# Patient Record
Sex: MALE | Race: WHITE
[De-identification: names, ages, dates, MRNs, and addresses within clinical notes are randomized per-mention and may not be internally consistent; named-entity substitution may affect disease eponyms.]

---

## 2018-10-08 ENCOUNTER — HOSPITAL ENCOUNTER (OUTPATIENT)
Dept: HOSPITAL 88 - MRI | Age: 58
End: 2018-10-08
Attending: PSYCHIATRY & NEUROLOGY
Payer: COMMERCIAL

## 2018-10-08 DIAGNOSIS — R42: Primary | ICD-10-CM

## 2018-10-08 PROCEDURE — 70553 MRI BRAIN STEM W/O & W/DYE: CPT

## 2018-10-08 NOTE — DIAGNOSTIC IMAGING REPORT
Exam: Brain MRI without and with IV contrast

History: Vertigo

Comparison studies: Prior brain MRI of 12/23/2010 is unavailable on the PACS

for comparison at time of dictation.



Technique:

Precontrast sagittal and axial T2 FS, axial DWI, precontrast axial T1 FLAIR,

axial T2*GRE and postcontrast axial coronal T1 FS and axial T2 FLAIR through

the whole brain. Additional 3-D T2 with axial, coronal and sagittal reformats,

axial T1 FLAIR and postcontrast axial and coronal T1 FLAIR through the IACs

were obtained.

Intravenous contrast: 20 cc MultiHance.



Findings:



Scalp: No abnormal signal.  No masses.

Bone marrow: Normal in signal intensity.



Brain sulci: Appropriate for age.

Ventricles: Normal in size . No hydrocephalus.



Parenchyma:

No abnormal signal intensities. Enhancing under malleus.

No masses, hemorrhage, or acute or chronic ischemic insults.



Cerebellopontine angle cisterns: No mass.

IACs: Normal nerves VII and VIII.

Labyrinths: Grossly normal formation. No abnormal signal intensity or

enhancement.

Mastoids: No abnormal signal intensity or enhancement.



Suprasellar region: No abnormalities.

Craniocervical junction: Patent foramen magnum.  No Chiari one malformation.

Vessels: Normal flow-voids in the arteries and sinuses. Right vertebral artery

is hypoplastic.





IMPRESSION:  



No abnormalities.



Signed by: Dr. Francis Smart M.D. on 10/8/2018 4:22 PM

## 2019-02-11 ENCOUNTER — HOSPITAL ENCOUNTER (OUTPATIENT)
Dept: HOSPITAL 88 - RAD | Age: 59
End: 2019-02-11
Attending: INTERNAL MEDICINE
Payer: COMMERCIAL

## 2019-02-11 DIAGNOSIS — M79.642: Primary | ICD-10-CM

## 2019-02-11 NOTE — DIAGNOSTIC IMAGING REPORT
Radiographs of the left finger - 3 views



HISTORY:  Abnormal growth on left fifth digit area

COMPARISON: None available.

     

FINDINGS:

Bones:

No acute displaced fracture.  

Osseous alignment is within normal limits.



Joints:

The joint spaces are well-maintained.



Soft tissues:

Mild soft tissue swelling about the fifth finger. No radiopaque foreign body.





IMPRESSION: 

Mild soft tissue swelling about the fifth finger. No radiopaque foreign body.



Signed by: Dr. John Paul Maldonado M.D. on 2/11/2019 4:10 PM

## 2019-06-12 LAB
BASOPHILS # BLD AUTO: 0 10*3/UL (ref 0–0.1)
BASOPHILS NFR BLD AUTO: 0.5 % (ref 0–1)
DEPRECATED NEUTROPHILS # BLD AUTO: 4.8 10*3/UL (ref 2.1–6.9)
EOSINOPHIL # BLD AUTO: 0.2 10*3/UL (ref 0–0.4)
EOSINOPHIL NFR BLD AUTO: 2.4 % (ref 0–6)
ERYTHROCYTE [DISTWIDTH] IN CORD BLOOD: 12.9 % (ref 11.7–14.4)
HCT VFR BLD AUTO: 46 % (ref 38.2–49.6)
HGB BLD-MCNC: 15.3 G/DL (ref 14–18)
LYMPHOCYTES # BLD: 2.5 10*3/UL (ref 1–3.2)
LYMPHOCYTES NFR BLD AUTO: 31.1 % (ref 18–39.1)
MCH RBC QN AUTO: 28.8 PG (ref 28–32)
MCHC RBC AUTO-ENTMCNC: 33.3 G/DL (ref 31–35)
MCV RBC AUTO: 86.5 FL (ref 81–99)
MONOCYTES # BLD AUTO: 0.5 10*3/UL (ref 0.2–0.8)
MONOCYTES NFR BLD AUTO: 6.6 % (ref 4.4–11.3)
NEUTS SEG NFR BLD AUTO: 59.2 % (ref 38.7–80)
PLATELET # BLD AUTO: 238 X10E3/UL (ref 140–360)
RBC # BLD AUTO: 5.32 X10E6/UL (ref 4.3–5.7)

## 2019-06-13 ENCOUNTER — HOSPITAL ENCOUNTER (OUTPATIENT)
Dept: HOSPITAL 88 - OR | Age: 59
Discharge: HOME | End: 2019-06-13
Attending: INTERNAL MEDICINE
Payer: COMMERCIAL

## 2019-06-13 VITALS — SYSTOLIC BLOOD PRESSURE: 121 MMHG | DIASTOLIC BLOOD PRESSURE: 86 MMHG

## 2019-06-13 DIAGNOSIS — M06.9: ICD-10-CM

## 2019-06-13 DIAGNOSIS — E66.9: ICD-10-CM

## 2019-06-13 DIAGNOSIS — E11.9: ICD-10-CM

## 2019-06-13 DIAGNOSIS — K62.1: ICD-10-CM

## 2019-06-13 DIAGNOSIS — I10: ICD-10-CM

## 2019-06-13 DIAGNOSIS — Z01.810: ICD-10-CM

## 2019-06-13 DIAGNOSIS — N20.0: ICD-10-CM

## 2019-06-13 DIAGNOSIS — D12.4: ICD-10-CM

## 2019-06-13 DIAGNOSIS — J45.909: ICD-10-CM

## 2019-06-13 DIAGNOSIS — K44.9: ICD-10-CM

## 2019-06-13 DIAGNOSIS — Z01.812: ICD-10-CM

## 2019-06-13 DIAGNOSIS — K64.8: ICD-10-CM

## 2019-06-13 DIAGNOSIS — M54.5: ICD-10-CM

## 2019-06-13 DIAGNOSIS — Z79.84: ICD-10-CM

## 2019-06-13 DIAGNOSIS — G47.33: ICD-10-CM

## 2019-06-13 DIAGNOSIS — J44.9: ICD-10-CM

## 2019-06-13 DIAGNOSIS — Z71.3: ICD-10-CM

## 2019-06-13 DIAGNOSIS — F41.9: ICD-10-CM

## 2019-06-13 DIAGNOSIS — Z12.11: Primary | ICD-10-CM

## 2019-06-13 DIAGNOSIS — I48.91: ICD-10-CM

## 2019-06-13 DIAGNOSIS — E23.0: ICD-10-CM

## 2019-06-13 DIAGNOSIS — F32.9: ICD-10-CM

## 2019-06-13 PROCEDURE — 93005 ELECTROCARDIOGRAM TRACING: CPT

## 2019-06-13 PROCEDURE — 36415 COLL VENOUS BLD VENIPUNCTURE: CPT

## 2019-06-13 PROCEDURE — 85025 COMPLETE CBC W/AUTO DIFF WBC: CPT

## 2019-06-13 PROCEDURE — 82948 REAGENT STRIP/BLOOD GLUCOSE: CPT

## 2019-06-13 PROCEDURE — 45385 COLONOSCOPY W/LESION REMOVAL: CPT

## 2019-06-13 PROCEDURE — 45378 DIAGNOSTIC COLONOSCOPY: CPT

## 2019-06-13 NOTE — XMS REPORT
Summary of Care: 3/14/17 - 3/14/17

                             Created on: 2113



MARIOLA BLACK

External Reference #: [id number]

: 1960

Sex: Male



Demographics







                          Address                   [address] [place]  [address]-

 

                          Home Phone                [phone]

 

                          Preferred Language        English

 

                          Marital Status            

 

                          Scientology Affiliation     None

 

                          Race                      White/

 

                          Ethnic Group              Non-





Author







                          Author                    [facility]

 

                          Organization              [facility]

 

                          Address                   Unknown

 

                          Phone                     Unavailable







Encounter





MULUGETA Dejesus(FIN) [id number] Date(s): 3/14/17 - 3/14/17

[facility] [address] [place] [address]-     [phone]
[phone]

Discharge Diagnosis: Other spondylosis with radiculopathy, lumbosacral region

Discharge Disposition: Home or Self Care

Attending Physician: Costa Barton DO





Vital Signs







                    1                   2                   3



                                         Most recent to   



                                         oldest [Reference   



                                         Range]:   

 

                                        185.42 cm 

                    (3/14/17 7:39 AM)                         



                                         Height   

 

                                        98.1 DegF 

                          (3/14/17 10:14 AM)        97.8 DegF 

                          (3/14/17 7:39 AM)          



                                         Temperature Oral   



                                         [96.4-99.1 DegF]   

 

                                        138/75 mmHg 

                          (3/14/17 10:14 AM)        148/89 mmHg 

*HI*

                          (3/14/17 7:39 AM)          



                                         Blood Pressure   



                                         [/60-90 mmHg]   

 

                                        18 BRMIN 

                    (3/14/17 7:39 AM)                         



                                         Respiratory Rate   



                                         [14-20 BRMIN]   

 

                                        87 bpm 

                          (3/14/17 10:14 AM)        95 bpm 

                          (3/14/17 9:01 AM)         115 bpm 

*HI*

                                        (3/14/17 7:39 AM)



                                         Peripheral Pulse   



                                         Rate [ bpm]   

 

                                        132.273 kg 

                    (3/14/17 7:39 AM)                         



                                         Weight   

 

                                        38.47 m2 

                    (3/14/17 7:39 AM)                         



                                         Body Mass Index   







Problem List







    



              Condition     Effective Dates     Status       Health Status     Informant

 

    



                           Diabetes(Confirmed)       Active  

 

    



                           HTN                       Active  



                                         (hypertension)(Confi    



                                         rmed)    

 

    



                           HTN                       Active  



                                         (hypertension)(Confi    



                                         rmed)    

 

    



                           Hypopituitarism(Conf      Active  



                                         irmed)    

 

    



                           Spondylitis(Confirme      Active  



                                         d)    

 

    



                           Osteoarthritis(Confi      Active  



                                         rmed)    







Allergies, Adverse Reactions, Alerts







   



                 Substance       Reaction        Severity        Status

 

   



                           NKDA                      Active







Medications





Flexeril

10 mg, Route: PO, ONCE, Dosing Weight 132.273, kg, Priority: STAT, Start date: 0
3/14/17 7:55:00 CDT, Stop date: 17 7:55:00 CDT

Start Date: 3/14/17

Stop Date: 3/14/17

Status: Completed



Flexeril 10 mg oral tablet

10 mg, PO, TID, PRN Muscle Spasm, X 7 day, # 30 tab, 0 Refill(s)

Start Date: 3/14/17

Stop Date: 3/21/17

Status: Ordered



ketOROLAC

60 mg, Route: IM, ONCE, Dosing Weight 132.273, kg, Priority: STAT, Start date: 0
3/14/17 7:55:00 CDT, Stop date: 17 7:55:00 CDT

Start Date: 3/14/17

Stop Date: 3/14/17

Status: Completed



ketOROLAC 10 mg oral tablet

10 mg=1 tab, PO, TID, PRN Pain, X 5 day, # 15 tab, 0 Refill(s)

Start Date: 3/14/17

Stop Date: 3/19/17

Status: Ordered



Norco 5/325 oral tablet

1 tab, Route: PO, Drug Form: TAB, Dosing Weight 132.273, kg, ONCE, STAT, Start d
ate: 17 7:55:00 CDT, Stop date: 17 7:55:00 CDT

Start Date: 3/14/17

Stop Date: 3/14/17

Status: Completed



Results





No data available for this section



Immunizations





No data available for this section



Procedures





No data available for this section



Social History







 



                           Social History Type       Response

 

 



                           Smoking Status            Never smoker; Exposure to Tobacco Smoke None; Cigarette Smoking

 Last 365



                                         Days No; Reg Smoking Cessation Counseling No







Assessment and Plan





No data available for this section

## 2019-06-13 NOTE — XMS REPORT
Patient Summary Document

                             Created on: 2019



MARIOLA BLACK

External Reference #: [id number]

: 1960

Sex: Male



Demographics







                          Address                   [address]

[place]  [address]

 

                          Home Phone                [phone]

 

                          Preferred Language        Unknown

 

                          Marital Status            Unknown

 

                          Latter day Affiliation     Unknown

 

                          Race                      Unknown

 

                                        Additional Race(s)  

 

                          Ethnic Group              Unknown





Author







                          Author                    [facility]

 

                          Address                   Unknown

 

                          Phone                     Unavailable







Care Team Providers







                    Care Team Member Name    Role                Phone

 

                    KATHLEEN HUNT    Unavailable         Unavailable

 

                    VERONICA BAILEY    Unavailable         Unavailable







Problems

This patient has no known problems.



Allergies, Adverse Reactions, Alerts

This patient has no known allergies or adverse reactions.



Medications

This patient has no known medications.



Results







           Test Description    Test Time    Test Comments    Text Results    Atomic Results    Result

 Comments

 

                FINGER LEFT     2019 16:09:00                                                               

                                           [facility]    
                   [address] [place] [address]    
 Patient Name: MARIOLA BLACK                                   MR #: [id number]  
                  : 1960                                   Age/Sex: 
58/M  Acct #: [id number]                              Req #: [id number]  Adm 
Physician:                                                      Ordered by: KATHLEEN BRUSH MD                            Report #: [id number]        
Location: [facility]                                     Room/Bed:                     
___________________________________________________________________________________________________
   Procedure: [id number] DX/FINGER LEFT  Exam Date: 19                     
      Exam Time: 1550                                              REPORT 
STATUS: Signed    Radiographs of the left finger - 3 views      HISTORY:  Abnor
mal growth on left fifth digit area   COMPARISON: None available.           
FINDINGS:   Bones:   No acute displaced fracture.     Osseous alignment is 
within normal limits.      Joints:   The joint spaces are well-maintained.      
Soft tissues:   Mild soft tissue swelling about the fifth finger. No radiopaque 
foreign body.         IMPRESSION:    Mild soft tissue swelling about the fifth 
finger. No radiopaque foreign body.      Signed by: Dr. John Paul Maldonado M.D. on 
2019 4:10 PM        Dictated By: JOHN PAUL MALDONADO MD, MD  Electronically Signed
By: JOHN PAUL MALDONADO MD, MD on 19  Transcribed By: NERISSA on 19       COPY TO:   KATHLEEN HUNT MD             

 

                MRI BRAIN WOW    2018-10-08 16:17:00                        [place] [address]      Patient Name: 
MARIOLA BLACK   MR #: [id number]    : 1960 Age/Sex: 58/M  Acct #: 
[id number] Req #: [id number]  Adm Physician:     Ordered by: VERONICA BAILEY MD  Report #: [id number]   Location: MRI  Room/Bed:     
______________________________________________________________________________
_____________________    Procedure: [id number] MRI/MRI BRAIN WOW  Exam Date:     
                       Exam Time:        REPORT STATUS: Signed    Exam: Brain 
MRI without and with IV contrast   History: Vertigo   Comparison studies: Prior 
brain MRI of 2010 is unavailable on the PACS   for comparison at time of 
dictation.      Technique:   Precontrast sagittal and axial T2 FS, axial DWI, 
precontrast axial T1 FLAIR,   axial T2*GRE and postcontrast axial coronal T1 FS 
and axial T2 FLAIR through   the whole brain. Additional 3-D T2 with axial, 
coronal and sagittal reformats,   axial T1 FLAIR and postcontrast axial and 
coronal T1 FLAIR through the IACs   were obtained.   Intravenous contrast: 20 cc
MultiHance.      Findings:      Scalp: No abnormal signal.  No masses.   Bone 
marrow: Normal in signal intensity.      Brain sulci: Appropriate for age.   
Ventricles: Normal in size . No hydrocephalus.      Parenchyma:   No abnormal 
signal intensities. Enhancing under malleus.   No masses, hemorrhage, or acute 
or chronic ischemic insults.      Cerebellopontine angle cisterns: No mass.   
IACs: Normal nerves VII and VIII.   Labyrinths: Grossly normal formation. No 
abnormal signal intensity or   enhancement.   Mastoids: No abnormal signal 
intensity or enhancement.      Suprasellar region: No abnormalities.   
Craniocervical junction: Patent foramen magnum.  No Chiari one malformation.   
Vessels: Normal flow-voids in the arteries and sinuses. Right vertebral artery  
is hypoplastic.         IMPRESSION:        No abnormalities.      Signed by: Dr. Benitez Smart M.D. on 10/8/2018 4:22 PM        Dictated By: BENITEZ SMART MD 
Electronically Signed By: BENITEZ SMART MD on 10/08/18 162  Transcribed By: 
NERISSA on 10/08/18 1622       COPY TO:   VERONICA BAILEY MD

## 2019-06-13 NOTE — XMS REPORT
Continuity of Care Document

                             Created on: 2017



MARIOLA BLACK

External Reference #: [id number]

: 1960

Sex: Male



Demographics







                          Address                   [address] [place] [address]

 

                          Home Phone                [phone]

 

                          Preferred Language        Unknown

 

                          Marital Status            Unknown

 

                          [employer] Affiliation     Unknown

 

                          Race                      Unknown

 

                          Ethnic Group              Unknown





Author







                          Author                    [facility]              Interface

 

                          Address                   Unknown

 

                          Phone                     Unavailable



                                                    



Problems

                    





                    Problem                            Status                            Onset Date     

                          Classification                            Date Reported       

                          Comments                            Source                    

 

                          Discharge Diagnosis: Dorsalgia                                                    

                    2017       

                                                      [facility]                    

 

                    BACK PAIN                            Active                            2017   

                                                                                       

                                        [facility]                    

 

                          Discharge Diagnosis: Acute back pain with sciatica                                

                          03/15/2017                                                   

                    2017                                                        [facility]    

                

 

                                        Discharge Diagnosis: Other spondylosis with radiculopathy, lumbosacral region   

                                                      2017                                           

                                        [facility]                    

 

                    Diabetes                            Active                                          

                    Problem                            2017                            

                                        [facility]                    

 

                          HTN (<span ID="[id number]">Confirmed</span>)                            Active  

                                                      Problem                        

                    2017                                                        [facility]     

               

 

                          HTN (<span ID="[id number]">Confirmed</span>)                            Active  

                                                      Problem                        

                    2017                                                        [facility]     

               

 

                    Hypopituitarism                            Active                                   

                          Problem                            2017                   

                                                      [facility]                    

 

                    Spondylitis                            Active                                       

                          Problem                            2017                       

                                                      [facility]                    

 

                    Osteoarthritis                            Active                                    

                          Problem                            2017                    

                                                      [facility]                    

 

                    Back pain                            Active                                         

                          Problem                            2017                         

                                                      [facility]                    

 

                    Sciatic pain                            Active                                      

                          Problem                            2017                      

                                                      [facility]                    



                                                                                
                                                                                
                                                                                
                     



Medications

                    





                    Medication                            Details                            Route      

                          Status                            Patient Instructions         

                          Ordering Provider                            Order Date           

                                        Source                    

 

                                        Acetaminophen 325 MG / Hydrocodone Bitartrate 10 MG Oral Tablet [Norco 10/325]  

                                        1 tab, PO, Q6H, PRN Pain, X 5 day, # 19 tab, 0 Refill(s) 

                                                       Active                        

                                                                            2017                 

                                        [facility]                    

 

                          predniSONE 20 mg oral tablet                            See Special Instructions, 

PO, Daily, 4 day regimen:  Day 1 - 40 mg (2 tabs)  Day 2 - 30 mg (1 1/2 tabs)  
Day 3 - 20 mg (1 tab)  Day 4 - 10 mg (1/2 tab), X 4 day, # 6 tab, 0 Refill(s)   
                                                    Active                             

                                                       2017                    

                                        [facility]                    

 

                          Zofran                            4 mg, Route: IVP, Drug form: INJ, ONCE, Dosing Weight

 132.727, kg, Priority: STAT, Start date: 17 12:11:00 CDT, Stop date: 
17 12:11:00 CDT                                                        Inactive

                                                                                    2017

                                        [facility]                    

 

                          Morphine                            4 mg, Route: IVP, ONCE, Dosing Weight 132.727,

 kg, Priority: STAT, Start date: 17 12:11:00 CDT, Stop date: 17 
12:11:00 CDT                                                        Inactive         

                                                                            2017  

                                        [facility]                    

 

                          Sodium Chloride 0.154 MEQ/ML Injectable Solution                            1,000 

mL, 1,000 ml/hr, Infuse Over: 1 hr, Route: IV, 1,000, Drug form: INJ, ONCE, 
Priority: STAT, Dosing Weight 132.727 kg, Start date: 17 11:44:00 CDT, 
Duration: 1 doses or times, Stop date: 17 11:44:00 CDT                    
                                                Inactive                                            

                                                2017                            [facility]

                    

 

                          Dexamethasone                            10 mg, Route: IM, ONCE, Dosing Weight 132.727,

 kg, Priority: STAT, Start date: 17 11:19:00 CDT, Stop date: 17 
11:19:00 CDT                                                        Inactive         

                                                                            2017  

                                        [facility]                    

 

                                        Acetaminophen 325 MG / Hydrocodone Bitartrate 10 MG Oral Tablet [Norco 10/325]  

                                        1 tab, Route: PO, Drug Form: TAB, Dosing Weight 132.727, 

kg, ONCE, STAT, Start date: 17 11:19:00 CDT, Stop date: 17 11:19:00 
CDT                                                        Inactive                  

                                                                            2017           

                                        [facility]                    

 

                          Valium                            10 mg, Route: PO, ONCE, Dosing Weight 132.727, kg,

 Priority: STAT, Start date: 17 11:19:00 CDT, Stop date: 17 11:19:00
CDT                                                        Inactive                  

                                                                            2017           

                                        [facility]                    

 

                          Diazepam 5 MG Oral Tablet [Valium]                            5 mg=1 tab, PO, TID,

 PRN MUSCLE SPASM, X 7 day, # 20 tab, 0 Refill(s)                                  

                      Active                                                           

                          03/15/2017                            [facility]          

          

 

                                        Acetaminophen 300 MG / Codeine Phosphate 60 MG Oral Tablet [Tylenol with Codeine

 #4]                                    1 tab, PO, Q6H, PRN Pain, not to exceed 4000 mg acetaminophen

 per day, X 7 day, # 28 tab, 0 Refill(s)                                           

                    Active                                                                    

                          03/15/2017                            [facility]                   

 

 

                          Morphine                            2 mg, Route: IVP, ONCE, Dosing Weight 132.727,

 kg, Priority: STAT, Start date: 03/15/17 1:22:00 CDT, Stop date: 03/15/17 
1:22:00 CDT                                                        Inactive          

                                                                            03/15/2017   

                                        [facility]                    

 

                          Ketorolac                            30 mg, Route: IVP, Drug form: INJ, ONCE, Dosing

 Weight 132.727, kg, Priority: STAT, Start date: 03/15/17 1:22:00 CDT, Stop 
date: 03/15/17 1:22:00 CDT                                                        Inactive

                                                                                    03/15/2017

                                        [facility]                    

 

                          Valium                            10 mg, Route: IVP, Drug form: INJ, ONCE, Dosing 

Weight 132.727, kg, Priority: STAT, Start date: 03/15/17 1:22:00 CDT, Stop date:
03/15/17 1:22:00 CDT                                                        Inactive 

                                                                                   03/15/2017

                                        [facility]                    

 

                          Dexamethasone                            8 mg, Route: IVP, ONCE, Dosing Weight 132.727,

 kg, Priority: STAT, Start date: 03/15/17 1:21:00 CDT, Stop date: 03/15/17 
1:21:00 CDT                                                        Inactive          

                                                                            03/15/2017   

                                        [facility]                    

 

                          Ketorolac Tromethamine 10 MG Oral Tablet                            10 mg=1 tab, PO,

 TID, PRN Pain, X 5 day, # 15 tab, 0 Refill(s)                                     

                    Active                                                              

                          2017                            [facility]             

       

 

                                        Cyclobenzaprine hydrochloride 10 MG Oral Tablet [Flexeril]                      

                                        10 mg, PO, TID, PRN Muscle Spasm, X 7 day, # 30 tab, 0 Refill(s)             

                                                Active                                      

                                                2017                            [facility]                    

 

                                        Acetaminophen 325 MG / Hydrocodone Bitartrate 5 MG Oral Tablet [Norco 5/325]    

                                        1 tab, Route: PO, Drug Form: TAB, Dosing Weight 132.273, kg,

 ONCE, STAT, Start date: 17 7:55:00 CDT, Stop date: 17 7:55:00 CDT  
                                                      Inactive                        

                                                                            2017                 

                                        [facility]                    

 

                          Flexeril                            10 mg, Route: PO, ONCE, Dosing Weight 132.273,

 kg, Priority: STAT, Start date: 17 7:55:00 CDT, Stop date: 17 
7:55:00 CDT                                                        Inactive          

                                                                            2017   

                                        [facility]                    

 

                          Ketorolac                            60 mg, Route: IM, ONCE, Dosing Weight 132.273,

 kg, Priority: STAT, Start date: 17 7:55:00 CDT, Stop date: 17 
7:55:00 CDT                                                        Inactive          

                                                                            2017   

                                        [facility]                    



                                                                                
                                                                                
                                                                                
                                                                                
                                                    



Allergies, Adverse Reactions, Alerts

                    





                    Substance                            Category                            Reaction   

                          Severity                            Reaction type           

                          Status                            Date Reported                     

                          Comments                            Source                    



                                                                



Immunizations

                    





                    Immunization                            Date Given                            Site  

                          Status                            Last Updated             

                          Comments                            Source                    



                                                                        



Results

                    





                    Order Name                            Results                            Value      

                          Reference Range                            Date                

                          Interpretation                            Comments                       

                                        Source                    

 

                          Abdomen/Pelvis wo IV contrast CT                            Abdomen/Pelvis wo IV contrast

 CT                                     Patient Name: MARIOLA BLACK

: 1960; Age: 57 years  y/o Male

MR: [id number]



Study: Abdomen/Pelvis wo IV contrast CT 3/18/2017 11:18 AM CDT

Referring physician:Herman Marsh MD

Clinical Indication: Back pain; Back pain for 1 week, radiating to upper back 
and lower left leg.   lumbar pain with radiculopathy. 3rd visit - dlp: 1138.42; 
  

Comparison: None





TECHNIQUE:

Sequential trans-axial images were obtained with a multi-detector helical CT 
without administration of IV contrast. Coronal and sagittal reconstructions were
obtained.  





FINDINGS: 

LUNG BASES: 2 mm noncalcified nodule right middle lobe, image 4 series 2. Mild 
atelectatic or fibrotic changes within the right lung base. No pleural effusion.
Heart size normal.



ABDOMINAL ORGANS: No focal liver or splenic abnormality. 5 mm nonobstructive 
right renal lower pole calyceal calculus. No other urinary tract calculus. No 
ureteral dilatation or obstruction. The adrenals, pancreas and gallbladder are 
unremarkable.



PERITONEUM AND RETROPERITONEUM: No abdominal or pelvic lymphadenopathy. No free 
intraperitoneal air. No abnormal fluid collection identified in the abdomen or 
pelvis. The abdominal aorta is unremarkable.



BOWEL: No bowel abnormality identified in the abdomen or pelvis. The appendix is
unremarkable.



PELVIS: No pelvic mass. Bladder is unremarkable.



BONES: Mild facet degenerative changes within the lumbar spine at L4-L5. Disc 
spaces are relatively well-maintained. No other bony lesion appreciated within 
the lumbar spine or pelvis.



IMPRESSION:



                                        5 mm nonobstructive right renal lower pole calyceal calculus.



No urinary tract obstruction or ureteral calculus, however.



No other acute abdominal or pelvic finding.



Mild right lower lobe subsegmental atelectasis or fibrosis. 2 mm noncalcified 
nodule in the right middle lobe.



Mild lower lumbar spine degenerative changes.



SL:  [id number]



                                                          2017                 

                                                      -

                                        -





Read by:  Regulo Pulido MD

Dictated Date/time:  17 11:55

Electronically Signed by:  Regulo Pulido MD              17 
12:03

FINAL REPORT

                                        [facility]                    



                                                                                
               



Vital Signs

                    





                    Vital Sign                            Value                            Date         

                          Comments                            Source                    

 

                    Temperature Oral (F)                            98.4 F                            2017

                                                        [facility]                 

   

 

                    Systolic (mm Hg)                            135                             2017

                                                         [facility]                 

   

 

                    Diastolic (mm Hg)                            84                             2017

                                                        [facility]                 

   

 

                    Respitory Rate                            16                             2017 

                                                       [facility]                  

  

 

                    Heart Rate                            88                             2017     

                                                      [facility]                    

 

                    Systolic (mm Hg)                            108                             2017

                                                        [facility]                 

   

 

                    Diastolic (mm Hg)                            75                             2017

                                                        [facility]                 

   

 

                    Heart Rate                            106                             2017    

                                                      [facility]                    

 

                    Weight                            132.727                             2017    

                                                      [facility]                    

 

                    BMI Calculated                            38.61                             2017

                                                        [facility]                 

   

 

                    Height                            185.42 cm                            2017   

                                                      [facility]                    



 

                    Temperature Oral (F)                            98.2 F                            2017

                                                        [facility]                 

   

 

                    Heart Rate                            120                             2017    

                                                      [facility]                    

 

                    Respitory Rate                            18                             2017 

                                                       [facility]                  

  

 

                    Systolic (mm Hg)                            101                             2017

                                                         [facility]                 

   

 

                    Diastolic (mm Hg)                            61                             2017

                                                        [facility]                 

   

 

                    Systolic (mm Hg)                            134                             03/15/2017

                                                        [facility]                 

   

 

                    Diastolic (mm Hg)                            87                             03/15/2017

                                                        [facility]                 

   

 

                    Heart Rate                            92                             03/15/2017     

                                                      [facility]                    

 

                    Respitory Rate                            18                             03/15/2017 

                                                        [facility]                  

  

 

                    Systolic (mm Hg)                            132                             03/15/2017

                                                        [facility]                 

   

 

                    Diastolic (mm Hg)                            91                             03/15/2017

                                                        [facility]                 

   

 

                    Temperature Oral (F)                            98.0 F                            03/15/2017

                                                        [facility]                 

   

 

                    Heart Rate                            100                             03/15/2017    

                                                      [facility]                    

 

                    Respitory Rate                            18                             03/15/2017 

                                                       [facility]                  

  

 

                    Weight                            132.727                             03/15/2017    

                                                      [facility]                    

 

                    Temperature Oral (F)                            97.6 F                            03/15/2017

                                                        [facility]                 

   

 

                    Respitory Rate                            20                             03/15/2017 

                                                       [facility]                  

  

 

                    Height                            185.42 cm                            03/15/2017   

                                                      [facility]                    



 

                    BMI Calculated                            38.61                             03/15/2017

                                                        [facility]                 

   

 

                    Heart Rate                            107                             03/15/2017    

                                                       [facility]                    

 

                    Systolic (mm Hg)                            146                             03/15/2017

                                                         [facility]                 

   

 

                    Diastolic (mm Hg)                            76                             03/15/2017

                                                        [facility]                 

   

 

                    Systolic (mm Hg)                            138                             2017

                                                        [facility]                 

   

 

                    Diastolic (mm Hg)                            75                             2017

                                                        [facility]                 

   

 

                    Heart Rate                            87                             2017     

                                                      [facility]                    

 

                    Temperature Oral (F)                            98.1 F                            2017

                                                        [facility]                 

   

 

                    Heart Rate                            95                             2017     

                                                      [facility]                    

 

                    Weight                            132.273                             2017    

                                                      [facility]                    

 

                    BMI Calculated                            38.47                             2017

                                                        [facility]                 

   

 

                    Temperature Oral (F)                            97.8 F                            2017

                                                        [facility]                 

   

 

                    Height                            185.42 cm                            2017   

                                                      [facility]                    



 

                    Heart Rate                            115                             2017    

                                                      [facility]                    

 

                    Respitory Rate                            18                             2017 

                                                       [facility]                  

  

 

                    Systolic (mm Hg)                            148                             2017

                                                        [facility]                 

   

 

                    Diastolic (mm Hg)                            89                             2017

                                                        [facility]                 

   



                                                                                
                                                                                
                                                                                
                                                                                
                                                                                
                                                                                
                                                                                
                                                                                
                                                                                
                                                                               



Encounters

                    





                    Location                            Location Details                            Encounter

 Type                            Encounter Number                            Reason For

 Visit                            Attending Provider                            ADM Date

                            DC Date                            Status                

                                        Source                    

 

                          [facility]                                               

                    Emergency                            [id number]                             

                           Costa Barton                             2017                                                        

[facility]                                               

                    Emergency                            [id number]                             

                           Jemma Mataen                             03/15/2017         

                    03/15/2017                                                        [facility]                                               

                    Emergency                            [id number]                             

                           Herman Marsh                             2017                                                        [facility]                    

 

                                                                            Outpatient                  

                    [id number]                                                        SAMARIA BARBOSA                             2017                                      

                          Active                            [facility]                 

   



                                                                                
                                       



Procedures

                    





                    Procedure                            Code                            Date           

                          Perfomer                            Comments                        

                                        Source

## 2019-06-13 NOTE — XMS REPORT
Summary of Care: 3/14/17 - 3/15/17

                             Created on: 2056



MARIOLA BLACK

External Reference #: [id number]

: 1960

Sex: Male



Demographics







                          Address                   [address] [place]  [address]-

 

                          Home Phone                [phone]

 

                          Preferred Language        English

 

                          Marital Status            

 

                          [employer] Affiliation     None

 

                          Race                      White/

 

                          Ethnic Group              Non-





Author







                          Author                    [facility]

 

                          Organization              [facility]

 

                          Address                   Unknown

 

                          Phone                     Unavailable







Encounter





MULUGETA Dejesus(ENRRIQUE) [id number] Date(s): 3/14/17 - 3/15/17

[facility] [address] [place] [address]-     [phone]
[phone]

Discharge Diagnosis: Acute back pain with sciatica

Discharge Disposition: Home or Self Care

Attending Physician: Jemma Veliz DO





Vital Signs







                    1                   2                   3



                                         Most recent to   



                                         oldest [Reference   



                                         Range]:   

 

                                        185.42 cm 

                    (3/14/17 11:58 PM)                         



                                         Height   

 

                                        98.0 DegF 

                          (3/15/17 2:57 AM)         97.6 DegF 

                          (3/14/17 11:58 PM)         



                                         Temperature Oral   



                                         [96.4-99.1 DegF]   

 

                                        134/87 mmHg 

                          (3/15/17 3:41 AM)         132/91 mmHg 

                          (3/15/17 2:57 AM)         146/76 mmHg 

*HI*

                                        (3/14/17 11:58 PM)



                                         Blood Pressure   



                                         [/60-90 mmHg]   

 

                                        18 BRMIN 

                          (3/15/17 3:41 AM)         18 BRMIN 

                          (3/15/17 2:57 AM)         20 BRMIN 

                                        (3/14/17 11:58 PM)



                                         Respiratory Rate   



                                         [14-20 BRMIN]   

 

                                        92 bpm 

                          (3/15/17 3:41 AM)         100 bpm 

                          (3/15/17 2:57 AM)         107 bpm 

*HI*

                                        (3/14/17 11:58 PM)



                                         Peripheral Pulse   



                                         Rate [ bpm]   

 

                                        132.727 kg 

                    (3/14/17 11:58 PM)                         



                                         Weight   

 

                                        38.61 m2 

                    (3/14/17 11:58 PM)                         



                                         Body Mass Index   







Problem List







    



              Condition     Effective Dates     Status       Health Status     Informant

 

    



                           Diabetes(Confirmed)       Active  

 

    



                           HTN                       Active  



                                         (hypertension)(Confi    



                                         rmed)    

 

    



                           HTN                       Active  



                                         (hypertension)(Confi    



                                         rmed)    

 

    



                           Hypopituitarism(Conf      Active  



                                         irmed)    

 

    



                           Spondylitis(Confirme      Active  



                                         d)    

 

    



                           Osteoarthritis(Confi      Active  



                                         rmed)    







Allergies, Adverse Reactions, Alerts







   



                 Substance       Reaction        Severity        Status

 

   



                           NKDA                      Active







Medications





dexamethasone

8 mg, Route: IVP, ONCE, Dosing Weight 132.727, kg, Priority: STAT, Start date: 0
3/15/17 1:21:00 CDT, Stop date: 03/15/17 1:21:00 CDT

Start Date: 3/15/17

Stop Date: 3/15/17

Status: Completed



ketOROLAC

30 mg, Route: IVP, Drug form: INJ, ONCE, Dosing Weight 132.727, kg, Priority: ST
AT, Start date: 03/15/17 1:22:00 CDT, Stop date: 03/15/17 1:22:00 CDT

Start Date: 3/15/17

Stop Date: 3/15/17

Status: Completed



morphine Sulfate

2 mg, Route: IVP, ONCE, Dosing Weight 132.727, kg, Priority: STAT, Start date: 0
3/15/17 1:22:00 CDT, Stop date: 03/15/17 1:22:00 CDT

Start Date: 3/15/17

Stop Date: 3/15/17

Status: Completed



Tylenol with Codeine #4 oral tablet

1 tab, PO, Q6H, PRN Pain, not to exceed 4000 mg acetaminophen per day, X 7 day, 
# 28 tab, 0 Refill(s)

Start Date: 3/15/17

Stop Date: 3/22/17

Status: Ordered



Valium

10 mg, Route: IVP, Drug form: INJ, ONCE, Dosing Weight 132.727, kg, Priority: ST
AT, Start date: 03/15/17 1:22:00 CDT, Stop date: 03/15/17 1:22:00 CDT

Start Date: 3/15/17

Stop Date: 3/15/17

Status: Completed



Valium 5 mg oral tablet

5 mg=1 tab, PO, TID, PRN MUSCLE SPASM, X 7 day, # 20 tab, 0 Refill(s)

Start Date: 3/15/17

Stop Date: 3/22/17

Status: Ordered



Results





No data available for this section



Immunizations





No data available for this section



Procedures





No data available for this section



Social History







 



                           Social History Type       Response

 

 



                           Smoking Status            Never smoker; Exposure to Tobacco Smoke None; Cigarette Smoking

 Last 365



                                         Days No; Reg Smoking Cessation Counseling No







Assessment and Plan





No data available for this section

## 2019-06-13 NOTE — XMS REPORT
Summary of Care: 3/18/17 - 3/18/17

                             Created on: 2056



MARIOLA BLACK

External Reference #: [id number]

: 1960

Sex: Male



Demographics







                          Address                   [address] [place]  [address]-

 

                          Home Phone                [phone]

 

                          Preferred Language        English

 

                          Marital Status            

 

                          Jewish Affiliation     None

 

                          Race                      White/

 

                          Ethnic Group              Non-





Author







                          Author                    [facility]

 

                          Organization              [facility]

 

                          Address                   Unknown

 

                          Phone                     Unavailable







Encounter





MULUGETA Dejesus(ENRRIQUE) [id number] Date(s): 3/18/17 - 3/18/17

[facility] [address] [place] [address]-     [phone]
[phone]

Discharge Diagnosis: Dorsalgia

Discharge Disposition: Home or Self Care

Attending Physician: Herman Marsh MD





Vital Signs







                    1                   2                   3



                                         Most recent to   



                                         oldest [Reference   



                                         Range]:   

 

                                        185.42 cm 

                    (3/18/17 10:47 AM)                         



                                         Height   

 

                                        98.4 DegF 

                          (3/18/17 1:38 PM)         98.2 DegF 

                          (3/18/17 10:47 AM)         



                                         Temperature Oral   



                                         [96.4-99.1 DegF]   

 

                                        135/84 mmHg 

                          (3/18/17 1:38 PM)         108/75 mmHg 

                          (3/18/17 11:32 AM)        101/61 mmHg 

                                        (3/18/17 10:47 AM)



                                         Blood Pressure   



                                         [/60-90 mmHg]   

 

                                        16 BRMIN 

                          (3/18/17 1:38 PM)         18 BRMIN 

                          (3/18/17 10:47 AM)         



                                         Respiratory Rate   



                                         [14-20 BRMIN]   

 

                                        88 bpm 

                          (3/18/17 1:38 PM)         106 bpm 

*HI*

                          (3/18/17 11:26 AM)        120 bpm 

*HI*

                                        (3/18/17 10:47 AM)



                                         Peripheral Pulse   



                                         Rate [ bpm]   

 

                                        132.727 kg 

                    (3/18/17 10:47 AM)                         



                                         Weight   

 

                                        38.61 m2 

                    (3/18/17 10:47 AM)                         



                                         Body Mass Index   







Problem List







    



              Condition     Effective Dates     Status       Health Status     Informant

 

    



                           Back pain(Confirmed)      Active  

 

    



                           Diabetes(Confirmed)       Active  

 

    



                           HTN                       Active  



                                         (hypertension)(Confi    



                                         rmed)    

 

    



                           HTN                       Active  



                                         (hypertension)(Confi    



                                         rmed)    

 

    



                           Hypopituitarism(Conf      Active  



                                         irmed)    

 

    



                           Spondylitis(Confirme      Active  



                                         d)    

 

    



                           Osteoarthritis(Confi      Active  



                                         rmed)    

 

    



                           Sciatic                   Active  



                                         pain(Confirmed)    







Allergies, Adverse Reactions, Alerts







   



                 Substance       Reaction        Severity        Status

 

   



                           NKDA                      Active







Medications





dexamethasone

10 mg, Route: IM, ONCE, Dosing Weight 132.727, kg, Priority: STAT, Start date: 0
3/18/17 11:19:00 CDT, Stop date: 17 11:19:00 CDT

Start Date: 3/18/17

Stop Date: 3/18/17

Status: Completed



morphine Sulfate

4 mg, Route: IVP, ONCE, Dosing Weight 132.727, kg, Priority: STAT, Start date: 0
3/18/17 12:11:00 CDT, Stop date: 17 12:11:00 CDT

Start Date: 3/18/17

Stop Date: 3/18/17

Status: Completed



Norco 10/325 oral tablet

1 tab, PO, Q6H, PRN Pain, X 5 day, # 19 tab, 0 Refill(s)

Start Date: 3/18/17

Stop Date: 3/23/17

Status: Ordered



Norco 10/325 oral tablet

1 tab, Route: PO, Drug Form: TAB, Dosing Weight 132.727, kg, ONCE, STAT, Start d
ate: 17 11:19:00 CDT, Stop date: 17 11:19:00 CDT

Start Date: 3/18/17

Stop Date: 3/18/17

Status: Completed



NS (Bolus) IV

1,000 mL, 1,000 ml/hr, Infuse Over: 1 hr, Route: IV, 1,000, Drug form: INJ, ONCE
, Priority: STAT, Dosing Weight 132.727 kg, Start date: 17 11:44:00 CDT, D
uration: 1 doses or times, Stop date: 17 11:44:00 CDT

Start Date: 3/18/17

Stop Date: 3/18/17

Status: Completed



predniSONE 20 mg oral tablet

See Special Instructions, PO, Daily, 4 day regimen:  Day 1 - 40 mg (2 tabs)  Day
2 - 30 mg (1 1/2 tabs)  Day 3 - 20 mg (1 tab)  Day 4 - 10 mg (1/2 tab), X 4 day,
# 6 tab, 0 Refill(s)

Start Date: 3/18/17

Stop Date: 3/22/17

Status: Ordered



Valium

10 mg, Route: PO, ONCE, Dosing Weight 132.727, kg, Priority: STAT, Start date: 0
3/18/17 11:19:00 CDT, Stop date: 17 11:19:00 CDT

Start Date: 3/18/17

Stop Date: 3/18/17

Status: Completed



Zofran

4 mg, Route: IVP, Drug form: INJ, ONCE, Dosing Weight 132.727, kg, Priority: STA
T, Start date: 17 12:11:00 CDT, Stop date: 17 12:11:00 CDT

Start Date: 3/18/17

Stop Date: 3/18/17

Status: Completed



Results





No data available for this section



Immunizations





No data available for this section



Procedures





No data available for this section



Social History







 



                           Social History Type       Response

 

 



                           Smoking Status            Never smoker; Exposure to Tobacco Smoke None; Cigarette Smoking

 Last 365



                                         Days No; Reg Smoking Cessation Counseling No







Assessment and Plan





No data available for this section